# Patient Record
Sex: FEMALE | Race: WHITE | URBAN - METROPOLITAN AREA
[De-identification: names, ages, dates, MRNs, and addresses within clinical notes are randomized per-mention and may not be internally consistent; named-entity substitution may affect disease eponyms.]

---

## 2020-09-17 ENCOUNTER — COMMUNICATION - RIVER FALLS (OUTPATIENT)
Dept: FAMILY MEDICINE | Facility: CLINIC | Age: 85
End: 2020-09-17

## 2020-09-17 ENCOUNTER — AMBULATORY - RIVER FALLS (OUTPATIENT)
Dept: FAMILY MEDICINE | Facility: CLINIC | Age: 85
End: 2020-09-17

## 2020-09-17 LAB
BACTERIA #/AREA URNS HPF: NORMAL /[HPF]
EPITHELIAL CELLS UR: NORMAL
HYALINE CASTS #/AREA URNS LPF: NORMAL /[LPF] (ref 0–5)
RBC #/AREA URNS AUTO: NORMAL /[HPF]
WBC #/AREA URNS AUTO: NORMAL /[HPF]

## 2020-09-20 LAB — BACTERIA SPEC CULT: NORMAL

## 2022-02-15 NOTE — TELEPHONE ENCOUNTER
---------------------From: Sujey Hardin MD To: Intelliche[employer]ilisa ([id number]_[facility]);   Sent: 9/18/2020 11:57:30 AM CDTSubject: lab results to [facility] Please fax urine results to [facility] - culture was ordered today. Awaiting results. [place]

## 2022-02-15 NOTE — TELEPHONE ENCOUNTER
---------------------  From: Bailee Amin CMA   To: Donnell Wills MD;     Cc: [facility] ([id number]_[facility]) ;      Sent: 9/17/2020 4:37:17 PM CDT    (Inserted Image. Unable to display)     [facility] drop off.    Please advise.[facility] until 09/21/20.---------------------  From: Donnell Wills MD   To: Bailee Amin CMA;     Sent: 9/17/2020 6:26:56 PM CDT  Subject: RE:      advise urine culture before treatmentOrdered UC per GTG and called lab to check if we have enough urine. Lab confirmed enough to run culture.---------------------  From: Bailee Amin CMA   To: [facility] ([address]) ;     Sent: 9/18/2020 7:54:36 AM CDT  Subject: FW: